# Patient Record
Sex: FEMALE | Race: BLACK OR AFRICAN AMERICAN | NOT HISPANIC OR LATINO | URBAN - METROPOLITAN AREA
[De-identification: names, ages, dates, MRNs, and addresses within clinical notes are randomized per-mention and may not be internally consistent; named-entity substitution may affect disease eponyms.]

---

## 2017-07-07 ENCOUNTER — OUTPATIENT (OUTPATIENT)
Dept: OUTPATIENT SERVICES | Facility: HOSPITAL | Age: 42
LOS: 1 days | Discharge: HOME | End: 2017-07-07

## 2017-07-07 DIAGNOSIS — Z98.890 OTHER SPECIFIED POSTPROCEDURAL STATES: Chronic | ICD-10-CM

## 2017-07-07 DIAGNOSIS — Z98.891 HISTORY OF UTERINE SCAR FROM PREVIOUS SURGERY: Chronic | ICD-10-CM

## 2017-07-07 DIAGNOSIS — N92.1 EXCESSIVE AND FREQUENT MENSTRUATION WITH IRREGULAR CYCLE: ICD-10-CM

## 2017-07-07 LAB
ABO RH CONFIRMATION: SIGNIFICANT CHANGE UP
ANION GAP SERPL CALC-SCNC: 8 MMOL/L — SIGNIFICANT CHANGE UP (ref 5–17)
BASOPHILS # BLD AUTO: 0 K/UL — SIGNIFICANT CHANGE UP (ref 0–0.2)
BASOPHILS NFR BLD AUTO: 0.8 % — SIGNIFICANT CHANGE UP (ref 0–2)
BLD GP AB SCN SERPL QL: SIGNIFICANT CHANGE UP
BUN SERPL-MCNC: 14 MG/DL — SIGNIFICANT CHANGE UP (ref 7–23)
CALCIUM SERPL-MCNC: 9.2 MG/DL — SIGNIFICANT CHANGE UP (ref 8.5–10.1)
CHLORIDE SERPL-SCNC: 106 MMOL/L — SIGNIFICANT CHANGE UP (ref 96–108)
CO2 SERPL-SCNC: 25 MMOL/L — SIGNIFICANT CHANGE UP (ref 22–31)
CREAT SERPL-MCNC: 0.72 MG/DL — SIGNIFICANT CHANGE UP (ref 0.5–1.3)
EOSINOPHIL # BLD AUTO: 0.2 K/UL — SIGNIFICANT CHANGE UP (ref 0–0.5)
EOSINOPHIL NFR BLD AUTO: 3.3 % — SIGNIFICANT CHANGE UP (ref 0–6)
GLUCOSE SERPL-MCNC: 99 MG/DL — SIGNIFICANT CHANGE UP (ref 70–99)
HCT VFR BLD CALC: 35.6 % — SIGNIFICANT CHANGE UP (ref 34.5–45)
HGB BLD-MCNC: 12.3 G/DL — SIGNIFICANT CHANGE UP (ref 11.5–15.5)
LYMPHOCYTES # BLD AUTO: 2.2 K/UL — SIGNIFICANT CHANGE UP (ref 1–3.3)
LYMPHOCYTES # BLD AUTO: 39.2 % — SIGNIFICANT CHANGE UP (ref 13–44)
MCHC RBC-ENTMCNC: 28.2 PG — SIGNIFICANT CHANGE UP (ref 27–34)
MCHC RBC-ENTMCNC: 34.6 GM/DL — SIGNIFICANT CHANGE UP (ref 32–36)
MCV RBC AUTO: 81.4 FL — SIGNIFICANT CHANGE UP (ref 80–100)
MONOCYTES # BLD AUTO: 0.4 K/UL — SIGNIFICANT CHANGE UP (ref 0–0.9)
MONOCYTES NFR BLD AUTO: 7.2 % — SIGNIFICANT CHANGE UP (ref 2–14)
NEUTROPHILS # BLD AUTO: 2.8 K/UL — SIGNIFICANT CHANGE UP (ref 1.8–7.4)
NEUTROPHILS NFR BLD AUTO: 49.6 % — SIGNIFICANT CHANGE UP (ref 43–77)
PLATELET # BLD AUTO: 548 K/UL — HIGH (ref 150–400)
POTASSIUM SERPL-MCNC: 3.9 MMOL/L — SIGNIFICANT CHANGE UP (ref 3.5–5.3)
POTASSIUM SERPL-SCNC: 3.9 MMOL/L — SIGNIFICANT CHANGE UP (ref 3.5–5.3)
RBC # BLD: 4.37 M/UL — SIGNIFICANT CHANGE UP (ref 3.8–5.2)
RBC # FLD: 11.6 % — SIGNIFICANT CHANGE UP (ref 10.3–14.5)
SODIUM SERPL-SCNC: 139 MMOL/L — SIGNIFICANT CHANGE UP (ref 135–145)
TYPE + AB SCN PNL BLD: SIGNIFICANT CHANGE UP
WBC # BLD: 5.6 K/UL — SIGNIFICANT CHANGE UP (ref 3.8–10.5)
WBC # FLD AUTO: 5.6 K/UL — SIGNIFICANT CHANGE UP (ref 3.8–10.5)

## 2017-07-10 RX ORDER — SODIUM CHLORIDE 9 MG/ML
3 INJECTION INTRAMUSCULAR; INTRAVENOUS; SUBCUTANEOUS EVERY 8 HOURS
Qty: 0 | Refills: 0 | Status: DISCONTINUED | OUTPATIENT
Start: 2017-07-11 | End: 2017-07-26

## 2017-07-10 RX ORDER — FAMOTIDINE 10 MG/ML
20 INJECTION INTRAVENOUS ONCE
Qty: 0 | Refills: 0 | Status: COMPLETED | OUTPATIENT
Start: 2017-07-11 | End: 2017-07-11

## 2017-07-10 RX ORDER — ACETAMINOPHEN 500 MG
975 TABLET ORAL ONCE
Qty: 0 | Refills: 0 | Status: COMPLETED | OUTPATIENT
Start: 2017-07-11 | End: 2017-07-11

## 2017-07-11 ENCOUNTER — OUTPATIENT (OUTPATIENT)
Dept: OUTPATIENT SERVICES | Facility: HOSPITAL | Age: 42
LOS: 1 days | Discharge: HOME | End: 2017-07-11
Payer: COMMERCIAL

## 2017-07-11 ENCOUNTER — RESULT REVIEW (OUTPATIENT)
Age: 42
End: 2017-07-11

## 2017-07-11 VITALS
RESPIRATION RATE: 15 BRPM | DIASTOLIC BLOOD PRESSURE: 76 MMHG | HEART RATE: 69 BPM | TEMPERATURE: 98 F | SYSTOLIC BLOOD PRESSURE: 126 MMHG

## 2017-07-11 VITALS
WEIGHT: 246.04 LBS | TEMPERATURE: 100 F | HEART RATE: 84 BPM | RESPIRATION RATE: 15 BRPM | OXYGEN SATURATION: 98 % | SYSTOLIC BLOOD PRESSURE: 121 MMHG | DIASTOLIC BLOOD PRESSURE: 68 MMHG

## 2017-07-11 DIAGNOSIS — Z98.891 HISTORY OF UTERINE SCAR FROM PREVIOUS SURGERY: Chronic | ICD-10-CM

## 2017-07-11 DIAGNOSIS — Z98.890 OTHER SPECIFIED POSTPROCEDURAL STATES: Chronic | ICD-10-CM

## 2017-07-11 LAB — HCG UR QL: NEGATIVE — SIGNIFICANT CHANGE UP

## 2017-07-11 PROCEDURE — 88305 TISSUE EXAM BY PATHOLOGIST: CPT | Mod: 26

## 2017-07-11 RX ORDER — ONDANSETRON 8 MG/1
4 TABLET, FILM COATED ORAL EVERY 6 HOURS
Qty: 0 | Refills: 0 | Status: DISCONTINUED | OUTPATIENT
Start: 2017-07-11 | End: 2017-07-11

## 2017-07-11 RX ORDER — FENTANYL CITRATE 50 UG/ML
50 INJECTION INTRAVENOUS
Qty: 0 | Refills: 0 | Status: DISCONTINUED | OUTPATIENT
Start: 2017-07-11 | End: 2017-07-11

## 2017-07-11 RX ORDER — MEPERIDINE HYDROCHLORIDE 50 MG/ML
12.5 INJECTION INTRAMUSCULAR; INTRAVENOUS; SUBCUTANEOUS
Qty: 0 | Refills: 0 | Status: DISCONTINUED | OUTPATIENT
Start: 2017-07-11 | End: 2017-07-11

## 2017-07-11 RX ORDER — SODIUM CHLORIDE 9 MG/ML
1000 INJECTION, SOLUTION INTRAVENOUS
Qty: 0 | Refills: 0 | Status: DISCONTINUED | OUTPATIENT
Start: 2017-07-11 | End: 2017-07-11

## 2017-07-11 RX ORDER — OXYCODONE AND ACETAMINOPHEN 5; 325 MG/1; MG/1
1 TABLET ORAL EVERY 4 HOURS
Qty: 0 | Refills: 0 | Status: DISCONTINUED | OUTPATIENT
Start: 2017-07-11 | End: 2017-07-11

## 2017-07-11 RX ORDER — OXYCODONE HYDROCHLORIDE 5 MG/1
5 TABLET ORAL EVERY 4 HOURS
Qty: 0 | Refills: 0 | Status: DISCONTINUED | OUTPATIENT
Start: 2017-07-11 | End: 2017-07-11

## 2017-07-11 RX ORDER — IBUPROFEN 200 MG
400 TABLET ORAL EVERY 8 HOURS
Qty: 0 | Refills: 0 | Status: DISCONTINUED | OUTPATIENT
Start: 2017-07-11 | End: 2017-07-26

## 2017-07-11 RX ADMIN — FENTANYL CITRATE 50 MICROGRAM(S): 50 INJECTION INTRAVENOUS at 12:53

## 2017-07-11 RX ADMIN — Medication 975 MILLIGRAM(S): at 10:27

## 2017-07-11 RX ADMIN — FAMOTIDINE 20 MILLIGRAM(S): 10 INJECTION INTRAVENOUS at 10:27

## 2017-07-11 RX ADMIN — SODIUM CHLORIDE 75 MILLILITER(S): 9 INJECTION, SOLUTION INTRAVENOUS at 13:08

## 2017-07-11 NOTE — ASU PATIENT PROFILE, ADULT - PMH
Eczema, unspecified type    Hiatal hernia with GERD    Obesity, unspecified obesity severity, unspecified obesity type    Urine frequency    Uterine leiomyoma, unspecified location    Uterine polyp

## 2017-07-11 NOTE — BRIEF OPERATIVE NOTE - PROCEDURE
Hysteroscopic procedure  07/11/2017    Active  GSIEGEL1  D&C  07/11/2017    Active  GSIEGEL1  Polypectomy  07/11/2017    Active  GSIEGEL1

## 2017-07-11 NOTE — BRIEF OPERATIVE NOTE - OPERATION/FINDINGS
eua: uterus 11 wk size, neg adnexal mass  hysteroscope; lower uterine polyp 1.5 x .5 cm into endocervical canal.both os seen.

## 2017-07-13 LAB — SURGICAL PATHOLOGY FINAL REPORT - CH: SIGNIFICANT CHANGE UP

## 2017-07-18 DIAGNOSIS — N92.1 EXCESSIVE AND FREQUENT MENSTRUATION WITH IRREGULAR CYCLE: ICD-10-CM

## 2017-07-18 DIAGNOSIS — K21.9 GASTRO-ESOPHAGEAL REFLUX DISEASE WITHOUT ESOPHAGITIS: ICD-10-CM

## 2017-07-18 DIAGNOSIS — N84.0 POLYP OF CORPUS UTERI: ICD-10-CM

## 2017-07-21 DIAGNOSIS — Z01.818 ENCOUNTER FOR OTHER PREPROCEDURAL EXAMINATION: ICD-10-CM

## 2017-07-21 DIAGNOSIS — N92.1 EXCESSIVE AND FREQUENT MENSTRUATION WITH IRREGULAR CYCLE: ICD-10-CM

## 2017-07-21 DIAGNOSIS — N84.0 POLYP OF CORPUS UTERI: ICD-10-CM

## 2017-07-21 DIAGNOSIS — K21.9 GASTRO-ESOPHAGEAL REFLUX DISEASE WITHOUT ESOPHAGITIS: ICD-10-CM

## 2019-01-30 PROBLEM — E66.9 OBESITY, UNSPECIFIED: Chronic | Status: ACTIVE | Noted: 2017-07-07

## 2019-01-30 PROBLEM — N84.0 POLYP OF CORPUS UTERI: Chronic | Status: ACTIVE | Noted: 2017-07-07

## 2019-01-30 PROBLEM — K21.9 GASTRO-ESOPHAGEAL REFLUX DISEASE WITHOUT ESOPHAGITIS: Chronic | Status: ACTIVE | Noted: 2017-07-07

## 2019-01-30 PROBLEM — L30.9 DERMATITIS, UNSPECIFIED: Chronic | Status: ACTIVE | Noted: 2017-07-07

## 2019-01-30 PROBLEM — R35.0 FREQUENCY OF MICTURITION: Chronic | Status: ACTIVE | Noted: 2017-07-07

## 2019-01-30 PROBLEM — D25.9 LEIOMYOMA OF UTERUS, UNSPECIFIED: Chronic | Status: ACTIVE | Noted: 2017-07-07

## 2019-04-04 ENCOUNTER — APPOINTMENT (OUTPATIENT)
Dept: DERMATOLOGY | Facility: CLINIC | Age: 44
End: 2019-04-04

## 2020-08-13 ENCOUNTER — TRANSCRIPTION ENCOUNTER (OUTPATIENT)
Age: 45
End: 2020-08-13

## 2020-10-31 ENCOUNTER — TRANSCRIPTION ENCOUNTER (OUTPATIENT)
Age: 45
End: 2020-10-31

## 2021-01-16 ENCOUNTER — OUTPATIENT (OUTPATIENT)
Dept: OUTPATIENT SERVICES | Facility: HOSPITAL | Age: 46
LOS: 1 days | End: 2021-01-16
Payer: COMMERCIAL

## 2021-01-16 ENCOUNTER — APPOINTMENT (OUTPATIENT)
Dept: ULTRASOUND IMAGING | Facility: CLINIC | Age: 46
End: 2021-01-16
Payer: COMMERCIAL

## 2021-01-16 ENCOUNTER — APPOINTMENT (OUTPATIENT)
Dept: MAMMOGRAPHY | Facility: CLINIC | Age: 46
End: 2021-01-16
Payer: COMMERCIAL

## 2021-01-16 DIAGNOSIS — Z98.890 OTHER SPECIFIED POSTPROCEDURAL STATES: Chronic | ICD-10-CM

## 2021-01-16 DIAGNOSIS — Z12.39 ENCOUNTER FOR OTHER SCREENING FOR MALIGNANT NEOPLASM OF BREAST: ICD-10-CM

## 2021-01-16 DIAGNOSIS — Z98.891 HISTORY OF UTERINE SCAR FROM PREVIOUS SURGERY: Chronic | ICD-10-CM

## 2021-01-16 PROCEDURE — 77067 SCR MAMMO BI INCL CAD: CPT

## 2021-01-16 PROCEDURE — 76641 ULTRASOUND BREAST COMPLETE: CPT

## 2021-01-16 PROCEDURE — 77067 SCR MAMMO BI INCL CAD: CPT | Mod: 26

## 2021-01-16 PROCEDURE — 76641 ULTRASOUND BREAST COMPLETE: CPT | Mod: 26,50

## 2021-01-16 PROCEDURE — 77063 BREAST TOMOSYNTHESIS BI: CPT | Mod: 26

## 2021-01-16 PROCEDURE — 77063 BREAST TOMOSYNTHESIS BI: CPT

## 2021-06-16 ENCOUNTER — APPOINTMENT (OUTPATIENT)
Dept: MRI IMAGING | Facility: CLINIC | Age: 46
End: 2021-06-16
Payer: COMMERCIAL

## 2021-06-16 ENCOUNTER — OUTPATIENT (OUTPATIENT)
Dept: OUTPATIENT SERVICES | Facility: HOSPITAL | Age: 46
LOS: 1 days | End: 2021-06-16
Payer: COMMERCIAL

## 2021-06-16 DIAGNOSIS — M51.17 INTERVERTEBRAL DISC DISORDERS WITH RADICULOPATHY, LUMBOSACRAL REGION: ICD-10-CM

## 2021-06-16 DIAGNOSIS — Z00.8 ENCOUNTER FOR OTHER GENERAL EXAMINATION: ICD-10-CM

## 2021-06-16 DIAGNOSIS — Z98.891 HISTORY OF UTERINE SCAR FROM PREVIOUS SURGERY: Chronic | ICD-10-CM

## 2021-06-16 DIAGNOSIS — Z98.890 OTHER SPECIFIED POSTPROCEDURAL STATES: Chronic | ICD-10-CM

## 2021-06-16 PROCEDURE — 72148 MRI LUMBAR SPINE W/O DYE: CPT | Mod: 26

## 2021-06-16 PROCEDURE — 72148 MRI LUMBAR SPINE W/O DYE: CPT

## 2021-08-10 ENCOUNTER — TRANSCRIPTION ENCOUNTER (OUTPATIENT)
Age: 46
End: 2021-08-10

## 2021-09-14 ENCOUNTER — RESULT REVIEW (OUTPATIENT)
Age: 46
End: 2021-09-14

## 2021-09-18 ENCOUNTER — APPOINTMENT (OUTPATIENT)
Dept: MAMMOGRAPHY | Facility: CLINIC | Age: 46
End: 2021-09-18

## 2021-09-18 ENCOUNTER — APPOINTMENT (OUTPATIENT)
Dept: ULTRASOUND IMAGING | Facility: CLINIC | Age: 46
End: 2021-09-18

## 2022-01-22 ENCOUNTER — OUTPATIENT (OUTPATIENT)
Dept: OUTPATIENT SERVICES | Facility: HOSPITAL | Age: 47
LOS: 1 days | End: 2022-01-22
Payer: COMMERCIAL

## 2022-01-22 ENCOUNTER — APPOINTMENT (OUTPATIENT)
Dept: MAMMOGRAPHY | Facility: CLINIC | Age: 47
End: 2022-01-22
Payer: COMMERCIAL

## 2022-01-22 ENCOUNTER — APPOINTMENT (OUTPATIENT)
Dept: ULTRASOUND IMAGING | Facility: CLINIC | Age: 47
End: 2022-01-22
Payer: COMMERCIAL

## 2022-01-22 DIAGNOSIS — Z00.8 ENCOUNTER FOR OTHER GENERAL EXAMINATION: ICD-10-CM

## 2022-01-22 DIAGNOSIS — Z98.891 HISTORY OF UTERINE SCAR FROM PREVIOUS SURGERY: Chronic | ICD-10-CM

## 2022-01-22 DIAGNOSIS — Z98.890 OTHER SPECIFIED POSTPROCEDURAL STATES: Chronic | ICD-10-CM

## 2022-01-22 PROCEDURE — 77063 BREAST TOMOSYNTHESIS BI: CPT | Mod: 26

## 2022-01-22 PROCEDURE — 76641 ULTRASOUND BREAST COMPLETE: CPT | Mod: 26,50

## 2022-01-22 PROCEDURE — 76641 ULTRASOUND BREAST COMPLETE: CPT

## 2022-01-22 PROCEDURE — 77063 BREAST TOMOSYNTHESIS BI: CPT

## 2022-01-22 PROCEDURE — 77067 SCR MAMMO BI INCL CAD: CPT

## 2022-01-22 PROCEDURE — 77067 SCR MAMMO BI INCL CAD: CPT | Mod: 26

## 2022-02-28 ENCOUNTER — RESULT REVIEW (OUTPATIENT)
Age: 47
End: 2022-02-28

## 2022-04-11 ENCOUNTER — OUTPATIENT (OUTPATIENT)
Dept: OUTPATIENT SERVICES | Facility: HOSPITAL | Age: 47
LOS: 1 days | End: 2022-04-11
Payer: COMMERCIAL

## 2022-04-11 ENCOUNTER — APPOINTMENT (OUTPATIENT)
Dept: CT IMAGING | Facility: CLINIC | Age: 47
End: 2022-04-11
Payer: COMMERCIAL

## 2022-04-11 DIAGNOSIS — R10.32 LEFT LOWER QUADRANT PAIN: ICD-10-CM

## 2022-04-11 DIAGNOSIS — Z98.891 HISTORY OF UTERINE SCAR FROM PREVIOUS SURGERY: Chronic | ICD-10-CM

## 2022-04-11 DIAGNOSIS — Z98.890 OTHER SPECIFIED POSTPROCEDURAL STATES: Chronic | ICD-10-CM

## 2022-04-11 PROCEDURE — 74176 CT ABD & PELVIS W/O CONTRAST: CPT

## 2022-04-11 PROCEDURE — 74176 CT ABD & PELVIS W/O CONTRAST: CPT | Mod: 26

## 2022-04-14 ENCOUNTER — TRANSCRIPTION ENCOUNTER (OUTPATIENT)
Age: 47
End: 2022-04-14

## 2022-05-19 ENCOUNTER — APPOINTMENT (OUTPATIENT)
Dept: RADIOLOGY | Facility: CLINIC | Age: 47
End: 2022-05-19
Payer: COMMERCIAL

## 2022-05-19 ENCOUNTER — OUTPATIENT (OUTPATIENT)
Dept: OUTPATIENT SERVICES | Facility: HOSPITAL | Age: 47
LOS: 1 days | End: 2022-05-19
Payer: COMMERCIAL

## 2022-05-19 DIAGNOSIS — Z98.890 OTHER SPECIFIED POSTPROCEDURAL STATES: Chronic | ICD-10-CM

## 2022-05-19 DIAGNOSIS — Z00.8 ENCOUNTER FOR OTHER GENERAL EXAMINATION: ICD-10-CM

## 2022-05-19 DIAGNOSIS — Z98.891 HISTORY OF UTERINE SCAR FROM PREVIOUS SURGERY: Chronic | ICD-10-CM

## 2022-05-19 PROCEDURE — 71100 X-RAY EXAM RIBS UNI 2 VIEWS: CPT | Mod: 26,LT

## 2022-05-19 PROCEDURE — 71100 X-RAY EXAM RIBS UNI 2 VIEWS: CPT

## 2022-05-19 PROCEDURE — 72070 X-RAY EXAM THORAC SPINE 2VWS: CPT | Mod: 26

## 2022-05-19 PROCEDURE — 72070 X-RAY EXAM THORAC SPINE 2VWS: CPT

## 2022-11-01 ENCOUNTER — NON-APPOINTMENT (OUTPATIENT)
Age: 47
End: 2022-11-01

## 2022-11-09 ENCOUNTER — APPOINTMENT (OUTPATIENT)
Dept: DERMATOLOGY | Facility: CLINIC | Age: 47
End: 2022-11-09

## 2022-11-09 ENCOUNTER — NON-APPOINTMENT (OUTPATIENT)
Age: 47
End: 2022-11-09

## 2022-11-09 PROCEDURE — 99203 OFFICE O/P NEW LOW 30 MIN: CPT

## 2022-11-17 ENCOUNTER — OUTPATIENT (OUTPATIENT)
Dept: OUTPATIENT SERVICES | Facility: HOSPITAL | Age: 47
LOS: 1 days | End: 2022-11-17
Payer: COMMERCIAL

## 2022-11-17 ENCOUNTER — APPOINTMENT (OUTPATIENT)
Dept: RADIOLOGY | Facility: CLINIC | Age: 47
End: 2022-11-17

## 2022-11-17 DIAGNOSIS — Z13.9 ENCOUNTER FOR SCREENING, UNSPECIFIED: ICD-10-CM

## 2022-11-17 DIAGNOSIS — Z98.890 OTHER SPECIFIED POSTPROCEDURAL STATES: Chronic | ICD-10-CM

## 2022-11-17 DIAGNOSIS — Z98.891 HISTORY OF UTERINE SCAR FROM PREVIOUS SURGERY: Chronic | ICD-10-CM

## 2022-11-17 PROCEDURE — 71046 X-RAY EXAM CHEST 2 VIEWS: CPT | Mod: 26

## 2022-11-17 PROCEDURE — 71046 X-RAY EXAM CHEST 2 VIEWS: CPT

## 2023-01-28 ENCOUNTER — OUTPATIENT (OUTPATIENT)
Dept: OUTPATIENT SERVICES | Facility: HOSPITAL | Age: 48
LOS: 1 days | End: 2023-01-28
Payer: COMMERCIAL

## 2023-01-28 ENCOUNTER — APPOINTMENT (OUTPATIENT)
Dept: ULTRASOUND IMAGING | Facility: CLINIC | Age: 48
End: 2023-01-28
Payer: COMMERCIAL

## 2023-01-28 ENCOUNTER — APPOINTMENT (OUTPATIENT)
Dept: MAMMOGRAPHY | Facility: CLINIC | Age: 48
End: 2023-01-28
Payer: COMMERCIAL

## 2023-01-28 DIAGNOSIS — Z12.31 ENCOUNTER FOR SCREENING MAMMOGRAM FOR MALIGNANT NEOPLASM OF BREAST: ICD-10-CM

## 2023-01-28 DIAGNOSIS — Z98.891 HISTORY OF UTERINE SCAR FROM PREVIOUS SURGERY: Chronic | ICD-10-CM

## 2023-01-28 DIAGNOSIS — Z98.890 OTHER SPECIFIED POSTPROCEDURAL STATES: Chronic | ICD-10-CM

## 2023-01-28 DIAGNOSIS — Z12.39 ENCOUNTER FOR OTHER SCREENING FOR MALIGNANT NEOPLASM OF BREAST: ICD-10-CM

## 2023-01-28 DIAGNOSIS — Z00.8 ENCOUNTER FOR OTHER GENERAL EXAMINATION: ICD-10-CM

## 2023-01-28 PROCEDURE — 77067 SCR MAMMO BI INCL CAD: CPT | Mod: 26

## 2023-01-28 PROCEDURE — 77063 BREAST TOMOSYNTHESIS BI: CPT | Mod: 26

## 2023-01-28 PROCEDURE — 76641 ULTRASOUND BREAST COMPLETE: CPT

## 2023-01-28 PROCEDURE — 76641 ULTRASOUND BREAST COMPLETE: CPT | Mod: 26,50

## 2023-01-28 PROCEDURE — 77067 SCR MAMMO BI INCL CAD: CPT

## 2023-01-28 PROCEDURE — 77063 BREAST TOMOSYNTHESIS BI: CPT

## 2023-03-19 ENCOUNTER — NON-APPOINTMENT (OUTPATIENT)
Age: 48
End: 2023-03-19

## 2023-12-26 ENCOUNTER — NON-APPOINTMENT (OUTPATIENT)
Age: 48
End: 2023-12-26

## 2024-03-24 ENCOUNTER — NON-APPOINTMENT (OUTPATIENT)
Age: 49
End: 2024-03-24

## 2024-05-02 NOTE — ASU PATIENT PROFILE, ADULT - PMH
Refill Request          Last Seen: Last Seen Department: 3/25/2024  Last Seen by PCP: 3/25/2024    Last Written: 04/01/2024      Next Appointment:   Future Appointments   Date Time Provider Department Center   6/4/2024  2:40 PM Demi Astorga APRN - CNP [facility]   6/18/2024  1:45 PM Devika Suero MD [facility]         Requested Prescriptions     Pending Prescriptions Disp Refills    gabapentin (NEURONTIN) 400 MG capsule [Pharmacy Med Name: GABAPENTIN 400MG CAPSULES] 90 capsule 0     Sig: Take 1 capsule by mouth 3 times daily.        Eczema, unspecified type    Hiatal hernia with GERD    Obesity, unspecified obesity severity, unspecified obesity type    Urine frequency    Uterine leiomyoma, unspecified location    Uterine polyp

## 2024-06-18 ENCOUNTER — APPOINTMENT (OUTPATIENT)
Dept: RADIOLOGY | Facility: CLINIC | Age: 49
End: 2024-06-18
Payer: COMMERCIAL

## 2024-06-18 ENCOUNTER — APPOINTMENT (OUTPATIENT)
Dept: CT IMAGING | Facility: CLINIC | Age: 49
End: 2024-06-18
Payer: COMMERCIAL

## 2024-06-18 ENCOUNTER — OUTPATIENT (OUTPATIENT)
Dept: OUTPATIENT SERVICES | Facility: HOSPITAL | Age: 49
LOS: 1 days | End: 2024-06-18
Payer: COMMERCIAL

## 2024-06-18 DIAGNOSIS — Z98.890 OTHER SPECIFIED POSTPROCEDURAL STATES: Chronic | ICD-10-CM

## 2024-06-18 DIAGNOSIS — R07.81 PLEURODYNIA: ICD-10-CM

## 2024-06-18 PROCEDURE — 71100 X-RAY EXAM RIBS UNI 2 VIEWS: CPT | Mod: 26,LT

## 2024-06-18 PROCEDURE — 71260 CT THORAX DX C+: CPT

## 2024-06-18 PROCEDURE — 71260 CT THORAX DX C+: CPT | Mod: 26

## 2024-06-18 PROCEDURE — 71100 X-RAY EXAM RIBS UNI 2 VIEWS: CPT

## 2024-07-02 ENCOUNTER — APPOINTMENT (OUTPATIENT)
Dept: ULTRASOUND IMAGING | Facility: CLINIC | Age: 49
End: 2024-07-02

## 2024-07-12 ENCOUNTER — APPOINTMENT (OUTPATIENT)
Dept: ULTRASOUND IMAGING | Facility: CLINIC | Age: 49
End: 2024-07-12
Payer: COMMERCIAL

## 2024-07-12 ENCOUNTER — OUTPATIENT (OUTPATIENT)
Dept: OUTPATIENT SERVICES | Facility: HOSPITAL | Age: 49
LOS: 1 days | End: 2024-07-12
Payer: COMMERCIAL

## 2024-07-12 DIAGNOSIS — R22.2 LOCALIZED SWELLING, MASS AND LUMP, TRUNK: ICD-10-CM

## 2024-07-12 DIAGNOSIS — Z98.890 OTHER SPECIFIED POSTPROCEDURAL STATES: Chronic | ICD-10-CM

## 2024-07-12 DIAGNOSIS — Z98.891 HISTORY OF UTERINE SCAR FROM PREVIOUS SURGERY: Chronic | ICD-10-CM

## 2024-07-12 PROCEDURE — 76705 ECHO EXAM OF ABDOMEN: CPT

## 2024-07-12 PROCEDURE — 76705 ECHO EXAM OF ABDOMEN: CPT | Mod: 26

## 2024-07-13 ENCOUNTER — OUTPATIENT (OUTPATIENT)
Dept: OUTPATIENT SERVICES | Facility: HOSPITAL | Age: 49
LOS: 1 days | End: 2024-07-13
Payer: COMMERCIAL

## 2024-07-13 ENCOUNTER — APPOINTMENT (OUTPATIENT)
Dept: MAMMOGRAPHY | Facility: CLINIC | Age: 49
End: 2024-07-13
Payer: COMMERCIAL

## 2024-07-13 ENCOUNTER — APPOINTMENT (OUTPATIENT)
Dept: ULTRASOUND IMAGING | Facility: CLINIC | Age: 49
End: 2024-07-13
Payer: COMMERCIAL

## 2024-07-13 DIAGNOSIS — Z00.8 ENCOUNTER FOR OTHER GENERAL EXAMINATION: ICD-10-CM

## 2024-07-13 DIAGNOSIS — Z98.891 HISTORY OF UTERINE SCAR FROM PREVIOUS SURGERY: Chronic | ICD-10-CM

## 2024-07-13 DIAGNOSIS — Z98.890 OTHER SPECIFIED POSTPROCEDURAL STATES: Chronic | ICD-10-CM

## 2024-07-13 PROCEDURE — 77067 SCR MAMMO BI INCL CAD: CPT | Mod: 26

## 2024-07-13 PROCEDURE — 77063 BREAST TOMOSYNTHESIS BI: CPT

## 2024-07-13 PROCEDURE — 77063 BREAST TOMOSYNTHESIS BI: CPT | Mod: 26

## 2024-07-13 PROCEDURE — 77067 SCR MAMMO BI INCL CAD: CPT

## 2024-09-04 ENCOUNTER — APPOINTMENT (OUTPATIENT)
Dept: OBGYN | Facility: CLINIC | Age: 49
End: 2024-09-04
Payer: COMMERCIAL

## 2024-09-04 VITALS
SYSTOLIC BLOOD PRESSURE: 133 MMHG | DIASTOLIC BLOOD PRESSURE: 77 MMHG | BODY MASS INDEX: 36.96 KG/M2 | WEIGHT: 230 LBS | HEIGHT: 66 IN

## 2024-09-04 DIAGNOSIS — Z83.49 FAMILY HISTORY OF OTHER ENDOCRINE, NUTRITIONAL AND METABOLIC DISEASES: ICD-10-CM

## 2024-09-04 DIAGNOSIS — Z83.438 FAMILY HISTORY OF OTHER DISORDER OF LIPOPROTEIN METABOLISM AND OTHER LIPIDEMIA: ICD-10-CM

## 2024-09-04 DIAGNOSIS — Z78.9 OTHER SPECIFIED HEALTH STATUS: ICD-10-CM

## 2024-09-04 DIAGNOSIS — Z01.419 ENCOUNTER FOR GYNECOLOGICAL EXAMINATION (GENERAL) (ROUTINE) W/OUT ABNORMAL FINDINGS: ICD-10-CM

## 2024-09-04 DIAGNOSIS — Z86.018 PERSONAL HISTORY OF OTHER BENIGN NEOPLASM: ICD-10-CM

## 2024-09-04 PROCEDURE — 99202 OFFICE O/P NEW SF 15 MIN: CPT

## 2024-09-04 PROCEDURE — 99386 PREV VISIT NEW AGE 40-64: CPT | Mod: 25

## 2024-09-04 RX ORDER — MULTIVIT-MIN/FOLIC ACID/HRB293 120 MCG-50
TABLET,CHEWABLE ORAL
Refills: 0 | Status: ACTIVE | COMMUNITY

## 2024-09-04 NOTE — PHYSICAL EXAM
[Appropriately responsive] : appropriately responsive [Alert] : alert [No Acute Distress] : no acute distress [No Lymphadenopathy] : no lymphadenopathy [Regular Rate Rhythm] : regular rate rhythm [No Murmurs] : no murmurs [Clear to Auscultation B/L] : clear to auscultation bilaterally [Soft] : soft [Non-tender] : non-tender [Non-distended] : non-distended [No HSM] : No HSM [No Lesions] : no lesions [No Mass] : no mass [Oriented x3] : oriented x3 [Examination Of The Breasts] : a normal appearance [No Masses] : no breast masses were palpable [Labia Majora] : normal [Labia Minora] : normal [No Bleeding] : There was no active vaginal bleeding [Normal] : normal [Enlarged ___ wks] : enlarged [unfilled] ~Uweeks [Uterine Adnexae] : normal

## 2024-09-04 NOTE — HISTORY OF PRESENT ILLNESS
[Patient reported mammogram was normal] : Patient reported mammogram was normal [Patient reported PAP Smear was normal] : Patient reported PAP Smear was normal [Patient reported colonoscopy was normal] : Patient reported colonoscopy was normal [FreeTextEntry1] : 50yo  LMP  not on BCM is here for GYN intake and annual exam. having perimenopause sxs. Hot flushes, insomnia  [Mammogramdate] : 2024  [PapSmeardate] : 2022 [ColonoscopyDate] : 2022 [TextBox_43] : due 2032

## 2024-09-04 NOTE — DISCUSSION/SUMMARY
[FreeTextEntry1] : 50yo  LMP  not on BCM with perimenopausal sxs and regular cycles.   Perimenopause:  - COntinue to track menses  - Given info for Hello [employer] products  - RTO after 2 months if no improvement   Fibroids:  TVUS and MD visit   RHM:  - DVS neg x 3 - Dentist, PCP, Derm as discussed  - Offered STI screen today. Pt ok with testing off pap and vag panel.  - Encouraged healthy diet, exercise, weight loss  Ana Pryor MD  Obstetrician/Gynecologist

## 2024-09-05 LAB — HPV HIGH+LOW RISK DNA PNL CVX: NOT DETECTED

## 2024-09-08 LAB — CYTOLOGY CVX/VAG DOC THIN PREP: ABNORMAL

## 2024-09-20 ENCOUNTER — APPOINTMENT (OUTPATIENT)
Dept: ANTEPARTUM | Facility: CLINIC | Age: 49
End: 2024-09-20
Payer: COMMERCIAL

## 2024-09-20 ENCOUNTER — ASOB RESULT (OUTPATIENT)
Age: 49
End: 2024-09-20

## 2024-09-20 PROCEDURE — 76857 US EXAM PELVIC LIMITED: CPT | Mod: 59

## 2024-09-20 PROCEDURE — 76830 TRANSVAGINAL US NON-OB: CPT

## 2024-09-27 ENCOUNTER — APPOINTMENT (OUTPATIENT)
Dept: OBGYN | Facility: CLINIC | Age: 49
End: 2024-09-27

## 2024-09-27 VITALS — SYSTOLIC BLOOD PRESSURE: 141 MMHG | DIASTOLIC BLOOD PRESSURE: 86 MMHG

## 2024-09-27 VITALS
SYSTOLIC BLOOD PRESSURE: 158 MMHG | HEIGHT: 66 IN | WEIGHT: 230 LBS | BODY MASS INDEX: 36.96 KG/M2 | DIASTOLIC BLOOD PRESSURE: 97 MMHG

## 2024-09-27 PROCEDURE — 99212 OFFICE O/P EST SF 10 MIN: CPT

## 2024-12-24 PROBLEM — F10.90 ALCOHOL USE: Status: ACTIVE | Noted: 2024-09-04

## 2025-02-10 ENCOUNTER — APPOINTMENT (OUTPATIENT)
Dept: OBGYN | Facility: CLINIC | Age: 50
End: 2025-02-10

## 2025-07-23 ENCOUNTER — APPOINTMENT (OUTPATIENT)
Dept: ULTRASOUND IMAGING | Facility: CLINIC | Age: 50
End: 2025-07-23
Payer: COMMERCIAL

## 2025-07-23 ENCOUNTER — APPOINTMENT (OUTPATIENT)
Dept: MAMMOGRAPHY | Facility: CLINIC | Age: 50
End: 2025-07-23
Payer: COMMERCIAL

## 2025-07-23 ENCOUNTER — OUTPATIENT (OUTPATIENT)
Dept: OUTPATIENT SERVICES | Facility: HOSPITAL | Age: 50
LOS: 1 days | End: 2025-07-23
Payer: COMMERCIAL

## 2025-07-23 DIAGNOSIS — Z00.8 ENCOUNTER FOR OTHER GENERAL EXAMINATION: ICD-10-CM

## 2025-07-23 DIAGNOSIS — Z12.39 ENCOUNTER FOR OTHER SCREENING FOR MALIGNANT NEOPLASM OF BREAST: ICD-10-CM

## 2025-07-23 DIAGNOSIS — Z98.890 OTHER SPECIFIED POSTPROCEDURAL STATES: Chronic | ICD-10-CM

## 2025-07-23 DIAGNOSIS — Z98.891 HISTORY OF UTERINE SCAR FROM PREVIOUS SURGERY: Chronic | ICD-10-CM

## 2025-07-23 PROCEDURE — 77063 BREAST TOMOSYNTHESIS BI: CPT

## 2025-07-23 PROCEDURE — 77063 BREAST TOMOSYNTHESIS BI: CPT | Mod: 26

## 2025-07-23 PROCEDURE — 77067 SCR MAMMO BI INCL CAD: CPT

## 2025-07-23 PROCEDURE — 76641 ULTRASOUND BREAST COMPLETE: CPT

## 2025-07-23 PROCEDURE — 76641 ULTRASOUND BREAST COMPLETE: CPT | Mod: 26,50

## 2025-07-23 PROCEDURE — 77067 SCR MAMMO BI INCL CAD: CPT | Mod: 26

## 2025-07-24 ENCOUNTER — APPOINTMENT (OUTPATIENT)
Dept: INTERNAL MEDICINE | Facility: CLINIC | Age: 50
End: 2025-07-24

## 2025-07-24 ENCOUNTER — TRANSCRIPTION ENCOUNTER (OUTPATIENT)
Age: 50
End: 2025-07-24

## 2025-07-24 ENCOUNTER — NON-APPOINTMENT (OUTPATIENT)
Age: 50
End: 2025-07-24

## 2025-07-25 ENCOUNTER — TRANSCRIPTION ENCOUNTER (OUTPATIENT)
Age: 50
End: 2025-07-25

## 2025-07-28 ENCOUNTER — APPOINTMENT (OUTPATIENT)
Dept: OBGYN | Facility: CLINIC | Age: 50
End: 2025-07-28
Payer: COMMERCIAL

## 2025-07-28 VITALS
WEIGHT: 239.13 LBS | SYSTOLIC BLOOD PRESSURE: 130 MMHG | BODY MASS INDEX: 38.43 KG/M2 | DIASTOLIC BLOOD PRESSURE: 82 MMHG | HEIGHT: 66 IN

## 2025-07-28 DIAGNOSIS — N39.46 MIXED INCONTINENCE: ICD-10-CM

## 2025-07-28 DIAGNOSIS — B00.9 HERPESVIRAL INFECTION, UNSPECIFIED: ICD-10-CM

## 2025-07-28 DIAGNOSIS — R23.2 FLUSHING: ICD-10-CM

## 2025-07-28 DIAGNOSIS — N95.1 MENOPAUSAL AND FEMALE CLIMACTERIC STATES: ICD-10-CM

## 2025-07-28 PROCEDURE — 99213 OFFICE O/P EST LOW 20 MIN: CPT

## 2025-07-28 PROCEDURE — 81025 URINE PREGNANCY TEST: CPT

## 2025-07-28 RX ORDER — FLUCONAZOLE 150 MG/1
150 TABLET ORAL
Qty: 2 | Refills: 0 | Status: ACTIVE | COMMUNITY
Start: 2025-07-28 | End: 1900-01-01

## 2025-07-28 RX ORDER — VALACYCLOVIR 500 MG/1
500 TABLET, FILM COATED ORAL
Qty: 14 | Refills: 2 | Status: ACTIVE | COMMUNITY
Start: 2025-07-28 | End: 1900-01-01

## 2025-07-29 DIAGNOSIS — N91.0 PRIMARY AMENORRHEA: ICD-10-CM

## 2025-07-29 LAB
APPEARANCE: CLEAR
BILIRUBIN URINE: NEGATIVE
BLOOD URINE: NEGATIVE
COLOR: YELLOW
GLUCOSE QUALITATIVE U: NEGATIVE
HCG UR QL: NEGATIVE
KETONES URINE: NEGATIVE
LEUKOCYTE ESTERASE URINE: NEGATIVE
NITRITE URINE: NEGATIVE
PH URINE: 6.5
PROTEIN URINE: NEGATIVE
QUALITY CONTROL: YES
SPECIFIC GRAVITY URINE: 1.02
UROBILINOGEN URINE: 0.2 (ref 0.2–?)

## 2025-07-29 RX ORDER — MEDROXYPROGESTERONE ACETATE 5 MG/1
5 TABLET ORAL
Qty: 20 | Refills: 0 | Status: ACTIVE | COMMUNITY
Start: 2025-07-29 | End: 1900-01-01

## 2025-08-04 ENCOUNTER — APPOINTMENT (OUTPATIENT)
Dept: CT IMAGING | Facility: CLINIC | Age: 50
End: 2025-08-04

## 2025-08-04 ENCOUNTER — OUTPATIENT (OUTPATIENT)
Dept: OUTPATIENT SERVICES | Facility: HOSPITAL | Age: 50
LOS: 1 days | End: 2025-08-04
Payer: COMMERCIAL

## 2025-08-04 DIAGNOSIS — Z00.8 ENCOUNTER FOR OTHER GENERAL EXAMINATION: ICD-10-CM

## 2025-08-04 DIAGNOSIS — Z98.890 OTHER SPECIFIED POSTPROCEDURAL STATES: Chronic | ICD-10-CM

## 2025-08-04 DIAGNOSIS — R10.30 LOWER ABDOMINAL PAIN, UNSPECIFIED: ICD-10-CM

## 2025-08-04 DIAGNOSIS — Z98.891 HISTORY OF UTERINE SCAR FROM PREVIOUS SURGERY: Chronic | ICD-10-CM

## 2025-08-04 PROCEDURE — 74177 CT ABD & PELVIS W/CONTRAST: CPT | Mod: 26

## 2025-08-04 PROCEDURE — 74177 CT ABD & PELVIS W/CONTRAST: CPT

## 2025-08-10 PROBLEM — R23.2 HOT FLASHES: Status: ACTIVE | Noted: 2025-08-10

## 2025-08-10 PROBLEM — N95.1 PERIMENOPAUSE: Status: ACTIVE | Noted: 2025-08-10

## 2025-08-19 DIAGNOSIS — N83.209 UNSPECIFIED OVARIAN CYST, UNSPECIFIED SIDE: ICD-10-CM

## 2025-08-26 ENCOUNTER — TRANSCRIPTION ENCOUNTER (OUTPATIENT)
Age: 50
End: 2025-08-26

## 2025-08-29 ENCOUNTER — APPOINTMENT (OUTPATIENT)
Dept: ULTRASOUND IMAGING | Facility: CLINIC | Age: 50
End: 2025-08-29

## 2025-09-10 ENCOUNTER — APPOINTMENT (OUTPATIENT)
Dept: ORTHOPEDIC SURGERY | Facility: CLINIC | Age: 50
End: 2025-09-10
Payer: COMMERCIAL

## 2025-09-10 VITALS — HEIGHT: 66 IN | BODY MASS INDEX: 37.77 KG/M2 | WEIGHT: 235 LBS

## 2025-09-10 DIAGNOSIS — M25.462 EFFUSION, LEFT KNEE: ICD-10-CM

## 2025-09-10 DIAGNOSIS — M79.18 MYALGIA, OTHER SITE: ICD-10-CM

## 2025-09-10 DIAGNOSIS — M17.12 UNILATERAL PRIMARY OSTEOARTHRITIS, LEFT KNEE: ICD-10-CM

## 2025-09-10 PROCEDURE — 73562 X-RAY EXAM OF KNEE 3: CPT | Mod: LT

## 2025-09-10 PROCEDURE — J3490M: CUSTOM | Mod: LT

## 2025-09-10 PROCEDURE — 99204 OFFICE O/P NEW MOD 45 MIN: CPT | Mod: 25

## 2025-09-10 PROCEDURE — 20611 DRAIN/INJ JOINT/BURSA W/US: CPT | Mod: LT

## 2025-09-15 ENCOUNTER — APPOINTMENT (OUTPATIENT)
Dept: ULTRASOUND IMAGING | Facility: CLINIC | Age: 50
End: 2025-09-15
Payer: COMMERCIAL

## 2025-09-15 PROCEDURE — 76830 TRANSVAGINAL US NON-OB: CPT | Mod: 26

## 2025-09-15 PROCEDURE — 76856 US EXAM PELVIC COMPLETE: CPT | Mod: 26
